# Patient Record
Sex: MALE | Race: WHITE | NOT HISPANIC OR LATINO | URBAN - METROPOLITAN AREA
[De-identification: names, ages, dates, MRNs, and addresses within clinical notes are randomized per-mention and may not be internally consistent; named-entity substitution may affect disease eponyms.]

---

## 2022-01-19 ENCOUNTER — APPOINTMENT (RX ONLY)
Dept: URBAN - METROPOLITAN AREA CLINIC 41 | Facility: CLINIC | Age: 62
Setting detail: DERMATOLOGY
End: 2022-01-19

## 2022-01-19 DIAGNOSIS — L82.1 OTHER SEBORRHEIC KERATOSIS: ICD-10-CM

## 2022-01-19 DIAGNOSIS — L28.0 LICHEN SIMPLEX CHRONICUS: ICD-10-CM

## 2022-01-19 PROBLEM — D23.71 OTHER BENIGN NEOPLASM OF SKIN OF RIGHT LOWER LIMB, INCLUDING HIP: Status: ACTIVE | Noted: 2022-01-19

## 2022-01-19 PROCEDURE — ? TREATMENT REGIMEN

## 2022-01-19 PROCEDURE — ? COUNSELING

## 2022-01-19 PROCEDURE — 99203 OFFICE O/P NEW LOW 30 MIN: CPT

## 2022-01-19 ASSESSMENT — LOCATION SIMPLE DESCRIPTION DERM
LOCATION SIMPLE: RIGHT FOREARM
LOCATION SIMPLE: RIGHT CALF

## 2022-01-19 ASSESSMENT — LOCATION DETAILED DESCRIPTION DERM
LOCATION DETAILED: RIGHT PROXIMAL CALF
LOCATION DETAILED: RIGHT PROXIMAL RADIAL DORSAL FOREARM

## 2022-01-19 ASSESSMENT — LOCATION ZONE DERM
LOCATION ZONE: LEG
LOCATION ZONE: ARM

## 2022-01-19 NOTE — HPI: SKIN LESION
What Type Of Note Output Would You Prefer (Optional)?: Standard Output
How Severe Is Your Skin Lesion?: mild
Has Your Skin Lesion Been Treated?: not been treated
Is This A New Presentation, Or A Follow-Up?: Skin Lesions
Which Family Member (Optional)?: Father
Additional History: Patient presents today for lesion on the right arm that is red, itchy, and scaly; lesion on the right calf that is brown and scaly

## 2022-01-19 NOTE — PROCEDURE: TREATMENT REGIMEN
Samples Given: -duobrii apply a thin layer to affected areas twice daily x 2 weeks
Detail Level: Detailed
Otc Regimen: -advised patient to moisturize daily to damp skin (recommended cerave lotion)

## 2022-06-16 ENCOUNTER — OFFICE VISIT (OUTPATIENT)
Dept: URBAN - METROPOLITAN AREA CLINIC 94 | Facility: CLINIC | Age: 62
End: 2022-06-16